# Patient Record
Sex: FEMALE | Race: WHITE
[De-identification: names, ages, dates, MRNs, and addresses within clinical notes are randomized per-mention and may not be internally consistent; named-entity substitution may affect disease eponyms.]

---

## 2024-04-03 RX ORDER — HYDROCORTISONE 2 G/100G
2 GEL TOPICAL DAILY
Qty: 1 | Refills: 3 | Status: ACTIVE | COMMUNITY
Start: 2024-04-03 | End: 1900-01-01

## 2024-07-31 ENCOUNTER — RX RENEWAL (OUTPATIENT)
Age: 39
End: 2024-07-31

## 2024-08-01 ENCOUNTER — APPOINTMENT (OUTPATIENT)
Dept: OBGYN | Facility: CLINIC | Age: 39
End: 2024-08-01
Payer: MEDICAID

## 2024-08-01 VITALS
BODY MASS INDEX: 20.41 KG/M2 | HEIGHT: 66 IN | TEMPERATURE: 98.7 F | DIASTOLIC BLOOD PRESSURE: 70 MMHG | SYSTOLIC BLOOD PRESSURE: 110 MMHG | WEIGHT: 127 LBS

## 2024-08-01 DIAGNOSIS — Z12.4 ENCOUNTER FOR SCREENING FOR MALIGNANT NEOPLASM OF CERVIX: ICD-10-CM

## 2024-08-01 DIAGNOSIS — Z11.3 ENCOUNTER FOR SCREENING FOR INFECTIONS WITH A PREDOMINANTLY SEXUAL MODE OF TRANSMISSION: ICD-10-CM

## 2024-08-01 DIAGNOSIS — Z01.419 ENCOUNTER FOR GYNECOLOGICAL EXAMINATION (GENERAL) (ROUTINE) W/OUT ABNORMAL FINDINGS: ICD-10-CM

## 2024-08-01 DIAGNOSIS — R10.2 PELVIC AND PERINEAL PAIN: ICD-10-CM

## 2024-08-01 DIAGNOSIS — Z11.51 ENCOUNTER FOR SCREENING FOR HUMAN PAPILLOMAVIRUS (HPV): ICD-10-CM

## 2024-08-01 PROCEDURE — 99385 PREV VISIT NEW AGE 18-39: CPT | Mod: 25

## 2024-08-01 PROCEDURE — 81003 URINALYSIS AUTO W/O SCOPE: CPT | Mod: QW

## 2024-08-01 NOTE — PLAN
[FreeTextEntry1] : annual with pap/cx/hpv  B/L baseline mammo  TVS - pt advised may be ovulation type pain as she is mid menstrual currently but will reevaluate after sono results  BCM offerred - declied - safe sex practices d/w/p  increase po fluids  vit e/c/d folic acid will seek therapist for anxiety- but pt states she is doing well and feels safe   f/u results and rto annually /prn

## 2024-08-01 NOTE — PROCEDURE
[Cervical Pap Smear] : cervical Pap smear [Liquid Base] : liquid base [GC & Chlamydia via Pap] : GC & Chlamydia via Pap [Tolerated Well] : the patient tolerated the procedure well [No Complications] : there were no complications [de-identified] : HPV

## 2024-08-01 NOTE — HISTORY OF PRESENT ILLNESS
[Patient reported PAP Smear was normal] : Patient reported PAP Smear was normal [Y] : Positive pregnancy history [FreeTextEntry1] : new pt present for annual exam  [TextBox_4] : New patient who presents for annual and c/o left side pelvic pain that comes and goes  hx  x 1  declines BCM- not seeking pregnancy at this time  hx- anxiety- has seen therapist no meds current -will seek out therapy  pt does feel better and safe  [Mammogramdate] : 0 [PapSmeardate] : 6/2021 [BoneDensityDate] : 0 [ColonoscopyDate] : 0 [LMPDate] : 7/14/2024 [PGHxTotal] : 2 [[facility] CenterxFulerm] : 1 [PGHxAbortions] : 1 [[facility] Centeriving] : 1

## 2024-08-03 LAB
C TRACH RRNA SPEC QL NAA+PROBE: NOT DETECTED
N GONORRHOEA RRNA SPEC QL NAA+PROBE: NOT DETECTED
SOURCE AMPLIFICATION: NORMAL
SOURCE AMPLIFICATION: NORMAL
T VAGINALIS RRNA SPEC QL NAA+PROBE: NOT DETECTED

## 2024-08-05 LAB
CYTOLOGY CVX/VAG DOC THIN PREP: NORMAL
HPV HIGH+LOW RISK DNA PNL CVX: NOT DETECTED

## 2024-08-15 ENCOUNTER — TRANSCRIPTION ENCOUNTER (OUTPATIENT)
Age: 39
End: 2024-08-15

## 2025-01-22 ENCOUNTER — RX RENEWAL (OUTPATIENT)
Age: 40
End: 2025-01-22

## 2025-08-29 ENCOUNTER — RX RENEWAL (OUTPATIENT)
Age: 40
End: 2025-08-29

## 2025-08-29 RX ORDER — NABUMETONE 1000 MG/1
1000 TABLET ORAL
Qty: 60 | Refills: 10 | Status: ACTIVE | COMMUNITY
Start: 2025-08-29 | End: 1900-01-01